# Patient Record
Sex: MALE | Race: WHITE
[De-identification: names, ages, dates, MRNs, and addresses within clinical notes are randomized per-mention and may not be internally consistent; named-entity substitution may affect disease eponyms.]

---

## 2020-03-01 ENCOUNTER — HOSPITAL ENCOUNTER (INPATIENT)
Dept: HOSPITAL 19 - COL.ER | Age: 60
LOS: 3 days | Discharge: HOME | DRG: 309 | End: 2020-03-04
Attending: INTERNAL MEDICINE | Admitting: INTERNAL MEDICINE
Payer: COMMERCIAL

## 2020-03-01 VITALS — OXYGEN SATURATION: 97 %

## 2020-03-01 VITALS — OXYGEN SATURATION: 94 %

## 2020-03-01 VITALS — OXYGEN SATURATION: 95 %

## 2020-03-01 VITALS — OXYGEN SATURATION: 96 %

## 2020-03-01 VITALS — OXYGEN SATURATION: 99 %

## 2020-03-01 VITALS — OXYGEN SATURATION: 92 %

## 2020-03-01 VITALS — OXYGEN SATURATION: 93 %

## 2020-03-01 VITALS
SYSTOLIC BLOOD PRESSURE: 107 MMHG | DIASTOLIC BLOOD PRESSURE: 76 MMHG | TEMPERATURE: 98.4 F | HEART RATE: 102 BPM | OXYGEN SATURATION: 96 %

## 2020-03-01 VITALS — OXYGEN SATURATION: 98 %

## 2020-03-01 VITALS — DIASTOLIC BLOOD PRESSURE: 76 MMHG | HEART RATE: 86 BPM | SYSTOLIC BLOOD PRESSURE: 107 MMHG | TEMPERATURE: 98.4 F

## 2020-03-01 VITALS — OXYGEN SATURATION: 100 %

## 2020-03-01 VITALS — WEIGHT: 178.79 LBS | HEIGHT: 74.02 IN | BODY MASS INDEX: 22.95 KG/M2

## 2020-03-01 DIAGNOSIS — Z92.21: ICD-10-CM

## 2020-03-01 DIAGNOSIS — J06.9: ICD-10-CM

## 2020-03-01 DIAGNOSIS — I48.0: Primary | ICD-10-CM

## 2020-03-01 DIAGNOSIS — D64.9: ICD-10-CM

## 2020-03-01 DIAGNOSIS — Z79.82: ICD-10-CM

## 2020-03-01 DIAGNOSIS — D69.6: ICD-10-CM

## 2020-03-01 DIAGNOSIS — D61.818: ICD-10-CM

## 2020-03-01 DIAGNOSIS — Z85.79: ICD-10-CM

## 2020-03-01 DIAGNOSIS — G62.9: ICD-10-CM

## 2020-03-01 DIAGNOSIS — Z79.01: ICD-10-CM

## 2020-03-01 LAB
ALBUMIN SERPL-MCNC: 3.7 GM/DL (ref 3.5–5)
ALP SERPL-CCNC: 76 U/L (ref 50–136)
ALT SERPL-CCNC: 28 U/L (ref 21–72)
ANION GAP SERPL CALC-SCNC: 9 MMOL/L (ref 7–16)
ANISOCYTOSIS BLD QL: (no result)
AST SERPL-CCNC: 34 U/L (ref 15–37)
BILIRUB SERPL-MCNC: 0.7 MG/DL (ref 0–1)
BUN SERPL-MCNC: 27 MG/DL (ref 9–20)
CALCIUM SERPL-MCNC: 8.9 MG/DL (ref 8.4–10.2)
CHLORIDE SERPL-SCNC: 101 MMOL/L (ref 98–107)
CO2 SERPL-SCNC: 25 MMOL/L (ref 22–30)
CREAT SERPL-SCNC: 1.17 UMOL/L (ref 0.66–1.25)
CRP SERPL-MCNC: 4.3 MG/DL (ref 0–0.9)
ERYTHROCYTE [DISTWIDTH] IN BLOOD BY AUTOMATED COUNT: 17.4 % (ref 11.5–14.5)
GLUCOSE SERPL-MCNC: 85 MG/DL (ref 74–106)
HCT VFR BLD AUTO: 37.7 % (ref 42–52)
HGB BLD-MCNC: 12.3 G/DL (ref 13.5–18)
HYPOCHROMIA BLD QL SMEAR: (no result)
LYMPHOCYTES NFR BLD MANUAL: 3 % (ref 20–51)
MACROCYTES BLD QL SMEAR: (no result)
MCH RBC QN AUTO: 33 PG (ref 27–31)
MCHC RBC AUTO-ENTMCNC: 33 G/DL (ref 33–37)
MCV RBC AUTO: 101 FL (ref 80–100)
MONOCYTES NFR BLD: 6 % (ref 1.7–9.3)
NEUTS BAND NFR BLD: 2 % (ref 0–10)
NEUTS SEG NFR BLD MANUAL: 89 % (ref 42–75.2)
NRBC BLD AUTO-RTO: 4 % (ref 0–6)
OVALOCYTES BLD QL SMEAR: (no result)
PLATELET # BLD AUTO: 85 K/MM3 (ref 130–400)
PLATELET BLD QL SMEAR: (no result)
PMV BLD AUTO: 13 FL (ref 7.4–10.4)
POTASSIUM SERPL-SCNC: 4.5 MMOL/L (ref 3.4–5)
PROT SERPL-MCNC: 6.6 GM/DL (ref 6.4–8.2)
RBC # BLD AUTO: 3.73 M/MM3 (ref 4.2–5.6)
SODIUM SERPL-SCNC: 135 MMOL/L (ref 137–145)
TROPONIN I SERPL-MCNC: < 0.012 NG/ML (ref 0–0.04)

## 2020-03-01 PROCEDURE — 5A2204Z RESTORATION OF CARDIAC RHYTHM, SINGLE: ICD-10-PCS | Performed by: INTERNAL MEDICINE

## 2020-03-02 VITALS
DIASTOLIC BLOOD PRESSURE: 86 MMHG | OXYGEN SATURATION: 94 % | TEMPERATURE: 100.5 F | HEART RATE: 71 BPM | SYSTOLIC BLOOD PRESSURE: 122 MMHG

## 2020-03-02 VITALS — OXYGEN SATURATION: 100 %

## 2020-03-02 VITALS — OXYGEN SATURATION: 96 %

## 2020-03-02 VITALS — OXYGEN SATURATION: 95 %

## 2020-03-02 VITALS — OXYGEN SATURATION: 94 %

## 2020-03-02 VITALS — OXYGEN SATURATION: 93 %

## 2020-03-02 VITALS — OXYGEN SATURATION: 91 %

## 2020-03-02 VITALS — OXYGEN SATURATION: 99 %

## 2020-03-02 VITALS — OXYGEN SATURATION: 95 % | SYSTOLIC BLOOD PRESSURE: 96 MMHG | HEART RATE: 75 BPM | DIASTOLIC BLOOD PRESSURE: 67 MMHG

## 2020-03-02 VITALS — OXYGEN SATURATION: 92 %

## 2020-03-02 VITALS — OXYGEN SATURATION: 89 %

## 2020-03-02 VITALS — OXYGEN SATURATION: 98 %

## 2020-03-02 VITALS — OXYGEN SATURATION: 97 %

## 2020-03-02 VITALS — HEART RATE: 78 BPM | DIASTOLIC BLOOD PRESSURE: 81 MMHG | SYSTOLIC BLOOD PRESSURE: 117 MMHG | TEMPERATURE: 98.8 F

## 2020-03-02 VITALS — DIASTOLIC BLOOD PRESSURE: 78 MMHG | HEART RATE: 68 BPM | SYSTOLIC BLOOD PRESSURE: 108 MMHG | OXYGEN SATURATION: 97 %

## 2020-03-02 VITALS
OXYGEN SATURATION: 94 % | HEART RATE: 85 BPM | SYSTOLIC BLOOD PRESSURE: 107 MMHG | TEMPERATURE: 97.8 F | DIASTOLIC BLOOD PRESSURE: 54 MMHG

## 2020-03-02 VITALS — OXYGEN SATURATION: 90 %

## 2020-03-02 VITALS — OXYGEN SATURATION: 93 % | DIASTOLIC BLOOD PRESSURE: 84 MMHG | HEART RATE: 75 BPM | SYSTOLIC BLOOD PRESSURE: 132 MMHG

## 2020-03-02 VITALS — OXYGEN SATURATION: 53 %

## 2020-03-02 VITALS — SYSTOLIC BLOOD PRESSURE: 126 MMHG | HEART RATE: 79 BPM | DIASTOLIC BLOOD PRESSURE: 87 MMHG | TEMPERATURE: 99.7 F

## 2020-03-02 VITALS — DIASTOLIC BLOOD PRESSURE: 80 MMHG | HEART RATE: 60 BPM | TEMPERATURE: 97.8 F | SYSTOLIC BLOOD PRESSURE: 105 MMHG

## 2020-03-02 VITALS — OXYGEN SATURATION: 84 %

## 2020-03-02 VITALS — SYSTOLIC BLOOD PRESSURE: 110 MMHG | HEART RATE: 86 BPM | DIASTOLIC BLOOD PRESSURE: 82 MMHG

## 2020-03-02 VITALS — OXYGEN SATURATION: 87 %

## 2020-03-02 VITALS — OXYGEN SATURATION: 98 % | DIASTOLIC BLOOD PRESSURE: 73 MMHG | SYSTOLIC BLOOD PRESSURE: 102 MMHG | HEART RATE: 78 BPM

## 2020-03-02 VITALS — OXYGEN SATURATION: 81 %

## 2020-03-02 VITALS
SYSTOLIC BLOOD PRESSURE: 122 MMHG | TEMPERATURE: 98.8 F | HEART RATE: 80 BPM | DIASTOLIC BLOOD PRESSURE: 88 MMHG | OXYGEN SATURATION: 94 %

## 2020-03-02 VITALS — HEART RATE: 70 BPM

## 2020-03-02 LAB
ANION GAP SERPL CALC-SCNC: 5 MMOL/L (ref 7–16)
BUN SERPL-MCNC: 21 MG/DL (ref 9–20)
CALCIUM SERPL-MCNC: 7.6 MG/DL (ref 8.4–10.2)
CHLORIDE SERPL-SCNC: 106 MMOL/L (ref 98–107)
CO2 SERPL-SCNC: 22 MMOL/L (ref 22–30)
CREAT SERPL-SCNC: 0.76 UMOL/L (ref 0.66–1.25)
ERYTHROCYTE [DISTWIDTH] IN BLOOD BY AUTOMATED COUNT: 17.4 % (ref 11.5–14.5)
GLUCOSE SERPL-MCNC: 81 MG/DL (ref 74–106)
HCT VFR BLD AUTO: 30.6 % (ref 42–52)
HGB BLD-MCNC: 10.1 G/DL (ref 13.5–18)
LYMPHOCYTES NFR BLD MANUAL: 4 % (ref 20–51)
MCH RBC QN AUTO: 33 PG (ref 27–31)
MCHC RBC AUTO-ENTMCNC: 33 G/DL (ref 33–37)
MCV RBC AUTO: 100 FL (ref 80–100)
MONOCYTES NFR BLD: 5 % (ref 1.7–9.3)
NEUTS BAND NFR BLD: 6 % (ref 0–10)
NEUTS SEG NFR BLD MANUAL: 85 % (ref 42–75.2)
OVALOCYTES BLD QL SMEAR: (no result)
PLATELET # BLD AUTO: 63 K/MM3 (ref 130–400)
PMV BLD AUTO: 12.7 FL (ref 7.4–10.4)
POTASSIUM SERPL-SCNC: 4 MMOL/L (ref 3.4–5)
RBC # BLD AUTO: 3.05 M/MM3 (ref 4.2–5.6)
SODIUM SERPL-SCNC: 133 MMOL/L (ref 137–145)

## 2020-03-02 NOTE — NUR
Cardioverted with once synched shock at 200 Joules. Received 200mg Propofol
for procedure. SPO2 is now 94% on RA.
Pt coughing continuously during procedure. Oral suction returns small to
scant amount of clear thin secretions. Hospitalist rounds at this time. Wife
in room and updated with plan of care to include addition of sotalol, prn
medications for persistent cough, and prophylactic Levaquin. Transthoracic
echo in progress. SR rate of 70.

## 2020-03-02 NOTE — NUR
MSW student met with the patient to complete initial intake. The patient lives
in [place] with his wife, Deneen. The patient denies DME usage and is
independent with ADLs. The patient's PCP is Dr. Parra and oncologist is Dr. Mcclure. The patient receives medications from [facility]. The patient
does not have advanced directives in the EMR but states they are completed and
designate his wife, Deneen.  will continue to follow to ensure a
safe discharge.

## 2020-03-03 VITALS — OXYGEN SATURATION: 94 %

## 2020-03-03 VITALS — OXYGEN SATURATION: 95 %

## 2020-03-03 VITALS — OXYGEN SATURATION: 96 %

## 2020-03-03 VITALS — OXYGEN SATURATION: 93 %

## 2020-03-03 VITALS — OXYGEN SATURATION: 91 %

## 2020-03-03 VITALS
OXYGEN SATURATION: 94 % | DIASTOLIC BLOOD PRESSURE: 87 MMHG | SYSTOLIC BLOOD PRESSURE: 130 MMHG | TEMPERATURE: 100.7 F | HEART RATE: 72 BPM

## 2020-03-03 VITALS — TEMPERATURE: 97.3 F | SYSTOLIC BLOOD PRESSURE: 122 MMHG | HEART RATE: 75 BPM | DIASTOLIC BLOOD PRESSURE: 87 MMHG

## 2020-03-03 VITALS — HEART RATE: 77 BPM | SYSTOLIC BLOOD PRESSURE: 136 MMHG | TEMPERATURE: 99.5 F | DIASTOLIC BLOOD PRESSURE: 96 MMHG

## 2020-03-03 VITALS — OXYGEN SATURATION: 92 %

## 2020-03-03 VITALS — TEMPERATURE: 98.5 F | SYSTOLIC BLOOD PRESSURE: 94 MMHG | DIASTOLIC BLOOD PRESSURE: 60 MMHG | HEART RATE: 91 BPM

## 2020-03-03 VITALS — OXYGEN SATURATION: 82 %

## 2020-03-03 VITALS — OXYGEN SATURATION: 97 %

## 2020-03-03 VITALS — OXYGEN SATURATION: 88 %

## 2020-03-03 VITALS — OXYGEN SATURATION: 87 %

## 2020-03-03 VITALS — OXYGEN SATURATION: 84 %

## 2020-03-03 VITALS
SYSTOLIC BLOOD PRESSURE: 129 MMHG | TEMPERATURE: 99.9 F | HEART RATE: 73 BPM | DIASTOLIC BLOOD PRESSURE: 88 MMHG | OXYGEN SATURATION: 95 %

## 2020-03-03 VITALS — DIASTOLIC BLOOD PRESSURE: 94 MMHG | TEMPERATURE: 98.6 F | HEART RATE: 68 BPM | SYSTOLIC BLOOD PRESSURE: 138 MMHG

## 2020-03-03 VITALS — OXYGEN SATURATION: 86 %

## 2020-03-03 VITALS — OXYGEN SATURATION: 89 %

## 2020-03-03 VITALS — OXYGEN SATURATION: 79 %

## 2020-03-03 LAB
ANION GAP SERPL CALC-SCNC: 6 MMOL/L (ref 7–16)
ANISOCYTOSIS BLD QL: (no result)
BUN SERPL-MCNC: 18 MG/DL (ref 9–20)
CALCIUM SERPL-MCNC: 7.1 MG/DL (ref 8.4–10.2)
CHLORIDE SERPL-SCNC: 104 MMOL/L (ref 98–107)
CO2 SERPL-SCNC: 20 MMOL/L (ref 22–30)
CREAT SERPL-SCNC: 0.96 UMOL/L (ref 0.66–1.25)
ERYTHROCYTE [DISTWIDTH] IN BLOOD BY AUTOMATED COUNT: 17.1 % (ref 11.5–14.5)
GLUCOSE SERPL-MCNC: 108 MG/DL (ref 74–106)
HCT VFR BLD AUTO: 30.3 % (ref 42–52)
HGB BLD-MCNC: 9.8 G/DL (ref 13.5–18)
HYPOCHROMIA BLD QL SMEAR: (no result)
MACROCYTES BLD QL SMEAR: (no result)
MAGNESIUM SERPL-MCNC: 2 MG/DL (ref 1.6–2.3)
MCH RBC QN AUTO: 32 PG (ref 27–31)
MCHC RBC AUTO-ENTMCNC: 32 G/DL (ref 33–37)
MCV RBC AUTO: 100 FL (ref 80–100)
MONOCYTES NFR BLD: 2 % (ref 1.7–9.3)
NEUTS BAND NFR BLD: 6 % (ref 0–10)
NEUTS SEG NFR BLD MANUAL: 92 % (ref 42–75.2)
PLATELET # BLD AUTO: 61 K/MM3 (ref 130–400)
PLATELET BLD QL SMEAR: (no result)
PMV BLD AUTO: 12.1 FL (ref 7.4–10.4)
POTASSIUM SERPL-SCNC: 3.6 MMOL/L (ref 3.4–5)
RBC # BLD AUTO: 3.02 M/MM3 (ref 4.2–5.6)
SODIUM SERPL-SCNC: 131 MMOL/L (ref 137–145)

## 2020-03-03 NOTE — NUR
Patient continues to exhibit low-grade temperature, with last temp being
100.7 F. Attempted to cool patient by removing blankets and turning on fan,
however, patient replaces blankets upon staff exit and requests that fan be
turned off. Will notify Irene and continue to monitor.

## 2020-03-03 NOTE — NUR
Notified Ireen of low-grade fever and noncompliance with attempts to cool.
Received orders for Tylenol 650mg PO, every 4 hours as needed.

## 2020-03-04 VITALS — OXYGEN SATURATION: 94 %

## 2020-03-04 VITALS — OXYGEN SATURATION: 98 %

## 2020-03-04 VITALS — OXYGEN SATURATION: 96 %

## 2020-03-04 VITALS — OXYGEN SATURATION: 93 %

## 2020-03-04 VITALS — OXYGEN SATURATION: 95 %

## 2020-03-04 VITALS — OXYGEN SATURATION: 97 %

## 2020-03-04 VITALS — OXYGEN SATURATION: 99 %

## 2020-03-04 VITALS — SYSTOLIC BLOOD PRESSURE: 130 MMHG | DIASTOLIC BLOOD PRESSURE: 91 MMHG | HEART RATE: 73 BPM | TEMPERATURE: 99.6 F

## 2020-03-04 VITALS
OXYGEN SATURATION: 95 % | SYSTOLIC BLOOD PRESSURE: 137 MMHG | HEART RATE: 75 BPM | DIASTOLIC BLOOD PRESSURE: 88 MMHG | TEMPERATURE: 100.2 F

## 2020-03-04 VITALS — HEART RATE: 66 BPM | DIASTOLIC BLOOD PRESSURE: 88 MMHG | TEMPERATURE: 98.2 F | SYSTOLIC BLOOD PRESSURE: 124 MMHG

## 2020-03-04 VITALS — OXYGEN SATURATION: 92 %

## 2020-03-04 VITALS — OXYGEN SATURATION: 100 %

## 2020-03-04 LAB
ANION GAP SERPL CALC-SCNC: 6 MMOL/L (ref 7–16)
ANISOCYTOSIS BLD QL: (no result)
BUN SERPL-MCNC: 15 MG/DL (ref 9–20)
CALCIUM SERPL-MCNC: 7.1 MG/DL (ref 8.4–10.2)
CHLORIDE SERPL-SCNC: 101 MMOL/L (ref 98–107)
CO2 SERPL-SCNC: 21 MMOL/L (ref 22–30)
CREAT SERPL-SCNC: 0.88 UMOL/L (ref 0.66–1.25)
ERYTHROCYTE [DISTWIDTH] IN BLOOD BY AUTOMATED COUNT: 16.6 % (ref 11.5–14.5)
GLUCOSE SERPL-MCNC: 83 MG/DL (ref 74–106)
HCT VFR BLD AUTO: 30 % (ref 42–52)
HGB BLD-MCNC: 10.2 G/DL (ref 13.5–18)
LYMPHOCYTES NFR BLD MANUAL: 4 % (ref 20–51)
MAGNESIUM SERPL-MCNC: 2.1 MG/DL (ref 1.6–2.3)
MCH RBC QN AUTO: 33 PG (ref 27–31)
MCHC RBC AUTO-ENTMCNC: 34 G/DL (ref 33–37)
MCV RBC AUTO: 98 FL (ref 80–100)
MONOCYTES NFR BLD: 1 % (ref 1.7–9.3)
NEUTS BAND NFR BLD: 18 % (ref 0–10)
NEUTS SEG NFR BLD MANUAL: 77 % (ref 42–75.2)
OVALOCYTES BLD QL SMEAR: (no result)
PLATELET # BLD AUTO: 70 K/MM3 (ref 130–400)
PLATELET BLD QL SMEAR: (no result)
PMV BLD AUTO: 13 FL (ref 7.4–10.4)
POTASSIUM SERPL-SCNC: 3.7 MMOL/L (ref 3.4–5)
RBC # BLD AUTO: 3.07 M/MM3 (ref 4.2–5.6)
SODIUM SERPL-SCNC: 129 MMOL/L (ref 137–145)
SPHEROCYTES BLD QL SMEAR: (no result)

## 2020-03-04 NOTE — NUR
T 100.2; Prn tylenol administered.  Patient reports feeling "fine". Extra
blankets removed from bed.  No other complaints at this time.

## 2020-12-07 ENCOUNTER — HOSPITAL ENCOUNTER (OUTPATIENT)
Dept: HOSPITAL 19 - COL.LAB | Age: 60
End: 2020-12-07
Attending: INTERNAL MEDICINE
Payer: COMMERCIAL

## 2020-12-07 DIAGNOSIS — U07.1: Primary | ICD-10-CM

## 2020-12-22 ENCOUNTER — HOSPITAL ENCOUNTER (OUTPATIENT)
Dept: HOSPITAL 19 - COL.CAR | Age: 60
Discharge: HOME | End: 2020-12-22
Attending: INTERNAL MEDICINE
Payer: COMMERCIAL

## 2020-12-22 VITALS — HEART RATE: 75 BPM | SYSTOLIC BLOOD PRESSURE: 105 MMHG | DIASTOLIC BLOOD PRESSURE: 77 MMHG

## 2020-12-22 VITALS — DIASTOLIC BLOOD PRESSURE: 80 MMHG | SYSTOLIC BLOOD PRESSURE: 104 MMHG | HEART RATE: 80 BPM

## 2020-12-22 VITALS — SYSTOLIC BLOOD PRESSURE: 107 MMHG | DIASTOLIC BLOOD PRESSURE: 76 MMHG | HEART RATE: 81 BPM

## 2020-12-22 VITALS — WEIGHT: 171.96 LBS | HEIGHT: 74.07 IN | BODY MASS INDEX: 22.07 KG/M2

## 2020-12-22 VITALS — HEART RATE: 81 BPM | DIASTOLIC BLOOD PRESSURE: 75 MMHG | SYSTOLIC BLOOD PRESSURE: 104 MMHG

## 2020-12-22 VITALS — SYSTOLIC BLOOD PRESSURE: 107 MMHG | DIASTOLIC BLOOD PRESSURE: 77 MMHG | HEART RATE: 79 BPM

## 2020-12-22 VITALS — HEART RATE: 107 BPM | DIASTOLIC BLOOD PRESSURE: 71 MMHG | TEMPERATURE: 98.1 F | SYSTOLIC BLOOD PRESSURE: 97 MMHG

## 2020-12-22 DIAGNOSIS — D64.9: ICD-10-CM

## 2020-12-22 DIAGNOSIS — I08.3: ICD-10-CM

## 2020-12-22 DIAGNOSIS — Z79.82: ICD-10-CM

## 2020-12-22 DIAGNOSIS — G62.9: ICD-10-CM

## 2020-12-22 DIAGNOSIS — I48.91: Primary | ICD-10-CM

## 2020-12-22 LAB
ANION GAP SERPL CALC-SCNC: 9 MMOL/L (ref 7–16)
APTT PPP: 30.9 SECONDS (ref 26–37)
BUN SERPL-MCNC: 18 MG/DL (ref 9–20)
CALCIUM SERPL-MCNC: 8.1 MG/DL (ref 8.4–10.2)
CHLORIDE SERPL-SCNC: 98 MMOL/L (ref 98–107)
CO2 SERPL-SCNC: 25 MMOL/L (ref 22–30)
CREAT SERPL-SCNC: 1.04 UMOL/L (ref 0.66–1.25)
ERYTHROCYTE [DISTWIDTH] IN BLOOD BY AUTOMATED COUNT: 16.8 % (ref 11.5–14.5)
GLUCOSE SERPL-MCNC: 99 MG/DL (ref 74–106)
HCT VFR BLD AUTO: 35.2 % (ref 42–52)
HGB BLD-MCNC: 11.7 G/DL (ref 13.5–18)
INR BLD: 1.4 (ref 0.8–3)
MAGNESIUM SERPL-MCNC: 2 MG/DL (ref 1.6–2.3)
MCH RBC QN AUTO: 33 PG (ref 27–31)
MCHC RBC AUTO-ENTMCNC: 33 G/DL (ref 33–37)
MCV RBC AUTO: 98 FL (ref 80–100)
PLATELET # BLD AUTO: 216 K/MM3 (ref 130–400)
PMV BLD AUTO: 10.5 FL (ref 7.4–10.4)
POTASSIUM SERPL-SCNC: 4.2 MMOL/L (ref 3.4–5)
PROTHROMBIN TIME: 15.5 SECONDS (ref 9.7–12.8)
RBC # BLD AUTO: 3.59 M/MM3 (ref 4.2–5.6)
SODIUM SERPL-SCNC: 132 MMOL/L (ref 137–145)
TSH SERPL DL<=0.005 MIU/L-ACNC: 1.82 UIU/ML (ref 0.47–4.68)

## 2021-02-18 ENCOUNTER — HOSPITAL ENCOUNTER (OUTPATIENT)
Dept: HOSPITAL 19 - COL.CAR | Age: 61
Discharge: HOME | End: 2021-02-18
Attending: INTERNAL MEDICINE
Payer: COMMERCIAL

## 2021-02-18 VITALS — DIASTOLIC BLOOD PRESSURE: 80 MMHG | TEMPERATURE: 98.4 F | HEART RATE: 54 BPM | SYSTOLIC BLOOD PRESSURE: 116 MMHG

## 2021-02-18 VITALS — WEIGHT: 183.42 LBS | BODY MASS INDEX: 23.54 KG/M2 | HEIGHT: 74.07 IN

## 2021-02-18 DIAGNOSIS — Z79.01: ICD-10-CM

## 2021-02-18 DIAGNOSIS — Z20.828: ICD-10-CM

## 2021-02-18 DIAGNOSIS — Z85.79: ICD-10-CM

## 2021-02-18 DIAGNOSIS — D64.9: ICD-10-CM

## 2021-02-18 DIAGNOSIS — D69.6: ICD-10-CM

## 2021-02-18 DIAGNOSIS — I10: ICD-10-CM

## 2021-02-18 DIAGNOSIS — Z53.8: ICD-10-CM

## 2021-02-18 DIAGNOSIS — Z79.82: ICD-10-CM

## 2021-02-18 DIAGNOSIS — I48.91: Primary | ICD-10-CM

## 2021-02-18 NOTE — NUR
Procedure cancelled by Dr Martinez.Discharge instructions given to pt.Pt
verbalizes understanding.Pt escorted out via ambulatory.

## 2021-04-13 ENCOUNTER — HOSPITAL ENCOUNTER (OUTPATIENT)
Dept: HOSPITAL 19 - COL.CAR | Age: 61
Discharge: HOME | End: 2021-04-13
Attending: INTERNAL MEDICINE
Payer: COMMERCIAL

## 2021-04-13 VITALS — HEART RATE: 59 BPM | DIASTOLIC BLOOD PRESSURE: 77 MMHG | SYSTOLIC BLOOD PRESSURE: 107 MMHG

## 2021-04-13 VITALS — SYSTOLIC BLOOD PRESSURE: 102 MMHG | HEART RATE: 58 BPM | DIASTOLIC BLOOD PRESSURE: 63 MMHG

## 2021-04-13 VITALS — HEART RATE: 57 BPM | TEMPERATURE: 98.3 F | SYSTOLIC BLOOD PRESSURE: 110 MMHG | DIASTOLIC BLOOD PRESSURE: 88 MMHG

## 2021-04-13 VITALS — WEIGHT: 192.46 LBS | BODY MASS INDEX: 24.7 KG/M2 | HEIGHT: 74.02 IN

## 2021-04-13 VITALS — HEART RATE: 52 BPM | DIASTOLIC BLOOD PRESSURE: 79 MMHG | SYSTOLIC BLOOD PRESSURE: 110 MMHG

## 2021-04-13 VITALS — SYSTOLIC BLOOD PRESSURE: 101 MMHG | HEART RATE: 56 BPM | DIASTOLIC BLOOD PRESSURE: 78 MMHG

## 2021-04-13 DIAGNOSIS — I10: ICD-10-CM

## 2021-04-13 DIAGNOSIS — Z79.01: ICD-10-CM

## 2021-04-13 DIAGNOSIS — I48.0: Primary | ICD-10-CM

## 2021-04-13 DIAGNOSIS — I34.0: ICD-10-CM

## 2021-04-13 DIAGNOSIS — D64.9: ICD-10-CM

## 2021-04-13 DIAGNOSIS — G62.9: ICD-10-CM

## 2021-04-13 DIAGNOSIS — C90.00: ICD-10-CM

## 2021-04-13 LAB
ANION GAP SERPL CALC-SCNC: 6 MMOL/L (ref 7–16)
APTT PPP: 29 SECONDS (ref 26–37)
BUN SERPL-MCNC: 22 MG/DL (ref 9–20)
CALCIUM SERPL-MCNC: 8.6 MG/DL (ref 8.4–10.2)
CHLORIDE SERPL-SCNC: 103 MMOL/L (ref 98–107)
CO2 SERPL-SCNC: 25 MMOL/L (ref 22–30)
CREAT SERPL-SCNC: 1 UMOL/L (ref 0.66–1.25)
ERYTHROCYTE [DISTWIDTH] IN BLOOD BY AUTOMATED COUNT: 16.2 % (ref 11.5–14.5)
GLUCOSE SERPL-MCNC: 92 MG/DL (ref 74–106)
HCT VFR BLD AUTO: 40.2 % (ref 42–52)
HGB BLD-MCNC: 13 G/DL (ref 13.5–18)
INR BLD: 1 (ref 0.8–3)
MAGNESIUM SERPL-MCNC: 1.8 MG/DL (ref 1.6–2.3)
MCH RBC QN AUTO: 34 PG (ref 27–31)
MCHC RBC AUTO-ENTMCNC: 32 G/DL (ref 33–37)
MCV RBC AUTO: 106 FL (ref 80–100)
PLATELET # BLD AUTO: 221 K/MM3 (ref 130–400)
PMV BLD AUTO: 9.9 FL (ref 7.4–10.4)
POTASSIUM SERPL-SCNC: 4.2 MMOL/L (ref 3.4–5)
PROTHROMBIN TIME: 11 SECONDS (ref 9.7–12.8)
RBC # BLD AUTO: 3.81 M/MM3 (ref 4.2–5.6)
SODIUM SERPL-SCNC: 134 MMOL/L (ref 137–145)
TSH SERPL DL<=0.005 MIU/L-ACNC: 2.22 UIU/ML (ref 0.47–4.68)

## 2021-04-13 NOTE — NUR
DC instructions reviewed with pt, he expresses understanding, denies questions
and signs Pt Signature Page. Pt has remained in sinus rhythm following
cardioversion, and states he is feeling better. Gait steady to restroom. IV
DC'd with catheter intact. He will wait in room for wife's arrival.

## 2021-06-08 ENCOUNTER — HOSPITAL ENCOUNTER (OUTPATIENT)
Dept: HOSPITAL 19 - COL.RAD | Age: 61
End: 2021-06-08
Payer: COMMERCIAL

## 2021-06-08 DIAGNOSIS — R06.00: ICD-10-CM

## 2021-06-08 DIAGNOSIS — C90.02: ICD-10-CM

## 2021-06-08 DIAGNOSIS — S22.31XA: Primary | ICD-10-CM

## 2021-10-02 ENCOUNTER — HOSPITAL ENCOUNTER (EMERGENCY)
Dept: HOSPITAL 19 - COL.ER | Age: 61
LOS: 1 days | Discharge: HOME | End: 2021-10-03
Attending: EMERGENCY MEDICINE
Payer: COMMERCIAL

## 2021-10-02 VITALS — HEIGHT: 74.02 IN | WEIGHT: 182.32 LBS | BODY MASS INDEX: 23.4 KG/M2

## 2021-10-02 VITALS — TEMPERATURE: 98.4 F

## 2021-10-02 DIAGNOSIS — Z85.79: ICD-10-CM

## 2021-10-02 DIAGNOSIS — S39.012A: Primary | ICD-10-CM
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
## 2021-10-02 DIAGNOSIS — Z87.81: ICD-10-CM

## 2021-10-03 VITALS — DIASTOLIC BLOOD PRESSURE: 70 MMHG | HEART RATE: 76 BPM | SYSTOLIC BLOOD PRESSURE: 124 MMHG

## 2021-10-10 ENCOUNTER — HOSPITAL ENCOUNTER (EMERGENCY)
Dept: HOSPITAL 19 - COL.ER | Age: 61
LOS: 1 days | Discharge: HOME | End: 2021-10-11
Attending: STUDENT IN AN ORGANIZED HEALTH CARE EDUCATION/TRAINING PROGRAM
Payer: COMMERCIAL

## 2021-10-10 VITALS — BODY MASS INDEX: 23.4 KG/M2 | HEIGHT: 74.02 IN | WEIGHT: 182.32 LBS

## 2021-10-10 VITALS — TEMPERATURE: 98.1 F
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
## 2021-10-10 DIAGNOSIS — Z87.39: ICD-10-CM

## 2021-10-10 DIAGNOSIS — S32.009A: Primary | ICD-10-CM

## 2021-10-10 DIAGNOSIS — S32.9XXA: ICD-10-CM

## 2021-10-11 VITALS — SYSTOLIC BLOOD PRESSURE: 122 MMHG | DIASTOLIC BLOOD PRESSURE: 76 MMHG | HEART RATE: 62 BPM

## 2021-10-21 ENCOUNTER — HOSPITAL ENCOUNTER (OUTPATIENT)
Dept: HOSPITAL 6 - RAD | Age: 61
End: 2021-10-21
Payer: COMMERCIAL

## 2021-10-21 DIAGNOSIS — M62.9: ICD-10-CM

## 2021-10-21 DIAGNOSIS — C90.00: Primary | ICD-10-CM

## 2021-10-21 PROCEDURE — A9585 GADOBUTROL INJECTION: HCPCS

## 2021-10-27 ENCOUNTER — HOSPITAL ENCOUNTER (OUTPATIENT)
Dept: HOSPITAL 19 - COL.RAD | Age: 61
End: 2021-10-27
Attending: FAMILY MEDICINE
Payer: COMMERCIAL

## 2021-10-27 VITALS — HEART RATE: 91 BPM | DIASTOLIC BLOOD PRESSURE: 78 MMHG | SYSTOLIC BLOOD PRESSURE: 129 MMHG

## 2021-10-27 VITALS — WEIGHT: 174.17 LBS | HEIGHT: 74.02 IN | BODY MASS INDEX: 22.35 KG/M2

## 2021-10-27 VITALS — TEMPERATURE: 97.9 F | DIASTOLIC BLOOD PRESSURE: 71 MMHG | HEART RATE: 97 BPM | SYSTOLIC BLOOD PRESSURE: 106 MMHG

## 2021-10-27 DIAGNOSIS — C90.00: Primary | ICD-10-CM

## 2021-10-27 DIAGNOSIS — R22.42: ICD-10-CM

## 2021-10-27 PROCEDURE — 32109: CPT

## 2021-11-03 ENCOUNTER — HOSPITAL ENCOUNTER (INPATIENT)
Dept: HOSPITAL 19 - MEDICAL | Age: 61
LOS: 4 days | Discharge: HOME | DRG: 603 | End: 2021-11-07
Attending: INTERNAL MEDICINE | Admitting: INTERNAL MEDICINE
Payer: COMMERCIAL

## 2021-11-03 VITALS — TEMPERATURE: 98.3 F | DIASTOLIC BLOOD PRESSURE: 78 MMHG | SYSTOLIC BLOOD PRESSURE: 139 MMHG | HEART RATE: 80 BPM

## 2021-11-03 VITALS — DIASTOLIC BLOOD PRESSURE: 83 MMHG | TEMPERATURE: 100.1 F | HEART RATE: 92 BPM | SYSTOLIC BLOOD PRESSURE: 161 MMHG

## 2021-11-03 VITALS — BODY MASS INDEX: 22.41 KG/M2 | HEIGHT: 74.02 IN | WEIGHT: 174.61 LBS

## 2021-11-03 DIAGNOSIS — I10: ICD-10-CM

## 2021-11-03 DIAGNOSIS — R78.81: ICD-10-CM

## 2021-11-03 DIAGNOSIS — I20.8: ICD-10-CM

## 2021-11-03 DIAGNOSIS — C90.00: ICD-10-CM

## 2021-11-03 DIAGNOSIS — M48.54XA: ICD-10-CM

## 2021-11-03 DIAGNOSIS — M48.56XA: ICD-10-CM

## 2021-11-03 DIAGNOSIS — D64.9: ICD-10-CM

## 2021-11-03 DIAGNOSIS — B95.1: ICD-10-CM

## 2021-11-03 DIAGNOSIS — L02.31: ICD-10-CM

## 2021-11-03 DIAGNOSIS — L02.214: Primary | ICD-10-CM

## 2021-11-03 DIAGNOSIS — R07.89: ICD-10-CM

## 2021-11-03 DIAGNOSIS — E44.0: ICD-10-CM

## 2021-11-03 LAB
ALBUMIN SERPL-MCNC: 1.8 GM/DL (ref 3.4–4.8)
ALP SERPL-CCNC: 102 U/L (ref 40–150)
ALT SERPL-CCNC: 20 U/L (ref 0–55)
ANION GAP SERPL CALC-SCNC: 12 MMOL/L (ref 7–16)
AST SERPL-CCNC: 18 U/L (ref 5–34)
BILIRUB SERPL-MCNC: 0.4 MG/DL (ref 0.2–1.2)
BUN SERPL-MCNC: 31 MG/DL (ref 8–26)
CALCIUM SERPL-MCNC: 8.7 MG/DL (ref 8.4–10.2)
CHLORIDE SERPL-SCNC: 111 MMOL/L (ref 98–107)
CO2 SERPL-SCNC: 17 MMOL/L (ref 23–31)
CREAT SERPL-SCNC: 1.24 MG/DL (ref 0.72–1.25)
EOSINOPHIL NFR BLD: 2 % (ref 0–4)
ERYTHROCYTE [DISTWIDTH] IN BLOOD BY AUTOMATED COUNT: 15.6 % (ref 11.5–14.5)
GLUCOSE SERPL-MCNC: 96 MG/DL (ref 70–99)
HCT VFR BLD AUTO: 25.6 % (ref 42–52)
HGB BLD-MCNC: 8.4 G/DL (ref 13.5–18)
LYMPHOCYTES NFR BLD MANUAL: 2 % (ref 20–51)
MCH RBC QN AUTO: 30 PG (ref 27–31)
MCHC RBC AUTO-ENTMCNC: 33 G/DL (ref 33–37)
MCV RBC AUTO: 93 FL (ref 80–100)
MONOCYTES NFR BLD: 11 % (ref 1.7–9.3)
NEUTS BAND NFR BLD: 18 % (ref 0–10)
NEUTS SEG NFR BLD MANUAL: 67 % (ref 42–75.2)
PLATELET # BLD AUTO: 94 K/MM3 (ref 130–400)
PLATELET BLD QL SMEAR: (no result)
PMV BLD AUTO: 11.7 FL (ref 7.4–10.4)
POTASSIUM SERPL-SCNC: 3.7 MMOL/L (ref 3.5–4.5)
PROT SERPL-MCNC: 6.4 GM/DL (ref 6.2–8.1)
RBC # BLD AUTO: 2.76 M/MM3 (ref 4.2–5.6)
SODIUM SERPL-SCNC: 140 MMOL/L (ref 136–145)

## 2021-11-03 NOTE — NUR
Vancomycin Initial Dosing Pharmacy Note
Ordering provider: Diana Mccoy DO
Indication/duration: Abscess, 7 days
LABS: SCr 1.24, CrCl~63. GFR 59
Recommendation: Give Vancomycin 1.5 gm IV x1 loading dose, then Vancomycin 1 gm
IV q12h. Pharmacy will continue to closely monitor and check a Vancomycin
trough on 11/5/21.
Loading dose: 1.5 grams
Maintenance dose: 1 gram every 12 hours
Trough goal: 15-20 ug/mL

## 2021-11-03 NOTE — NUR
Patient alert and oriented. Patient reports sharp pain to his left bottock
area 7/10. Patient denies SOB or N/V. Patient reports having chills.
Temp 100.1 F at this time. PRN Oxycodone and Tylenol given. IV ABX currently
infusing per MAR. Patient tolerated PO intake. Informed patient of NPO from
midnight. Patient verbalized understanding. Covid PCR sample collected and
sent to lab. Call light in reach. Will continue to monitor.

## 2021-11-03 NOTE — NUR
PT ADMITTED THIS AFTERNOON FOR SCHEDULED DRAINAGE OF ABSCESS. PT RESTING IN
BED. DENIES PAIN. NO NEEDS AT THIS TIME. CALL BELL IN REACH

## 2021-11-04 VITALS — SYSTOLIC BLOOD PRESSURE: 149 MMHG | HEART RATE: 90 BPM | DIASTOLIC BLOOD PRESSURE: 93 MMHG

## 2021-11-04 VITALS — HEART RATE: 84 BPM | TEMPERATURE: 98.3 F | DIASTOLIC BLOOD PRESSURE: 79 MMHG | SYSTOLIC BLOOD PRESSURE: 137 MMHG

## 2021-11-04 VITALS — SYSTOLIC BLOOD PRESSURE: 141 MMHG | DIASTOLIC BLOOD PRESSURE: 74 MMHG | HEART RATE: 92 BPM | TEMPERATURE: 99.3 F

## 2021-11-04 VITALS — SYSTOLIC BLOOD PRESSURE: 147 MMHG | HEART RATE: 84 BPM | DIASTOLIC BLOOD PRESSURE: 90 MMHG

## 2021-11-04 VITALS — HEART RATE: 88 BPM | DIASTOLIC BLOOD PRESSURE: 77 MMHG | TEMPERATURE: 99.7 F | SYSTOLIC BLOOD PRESSURE: 144 MMHG

## 2021-11-04 VITALS — DIASTOLIC BLOOD PRESSURE: 93 MMHG | HEART RATE: 97 BPM | SYSTOLIC BLOOD PRESSURE: 114 MMHG | TEMPERATURE: 98.4 F

## 2021-11-04 VITALS — SYSTOLIC BLOOD PRESSURE: 147 MMHG | DIASTOLIC BLOOD PRESSURE: 88 MMHG | HEART RATE: 83 BPM | TEMPERATURE: 98.5 F

## 2021-11-04 VITALS — SYSTOLIC BLOOD PRESSURE: 149 MMHG | HEART RATE: 87 BPM | DIASTOLIC BLOOD PRESSURE: 90 MMHG

## 2021-11-04 VITALS — DIASTOLIC BLOOD PRESSURE: 86 MMHG | HEART RATE: 83 BPM | SYSTOLIC BLOOD PRESSURE: 148 MMHG

## 2021-11-04 VITALS — HEART RATE: 98 BPM | SYSTOLIC BLOOD PRESSURE: 139 MMHG | DIASTOLIC BLOOD PRESSURE: 83 MMHG | TEMPERATURE: 98.7 F

## 2021-11-04 VITALS — DIASTOLIC BLOOD PRESSURE: 78 MMHG | HEART RATE: 89 BPM | TEMPERATURE: 98.4 F | SYSTOLIC BLOOD PRESSURE: 155 MMHG

## 2021-11-04 VITALS — DIASTOLIC BLOOD PRESSURE: 105 MMHG | HEART RATE: 86 BPM | SYSTOLIC BLOOD PRESSURE: 150 MMHG

## 2021-11-04 VITALS — SYSTOLIC BLOOD PRESSURE: 144 MMHG | HEART RATE: 87 BPM | DIASTOLIC BLOOD PRESSURE: 89 MMHG

## 2021-11-04 VITALS — HEART RATE: 86 BPM | SYSTOLIC BLOOD PRESSURE: 148 MMHG | DIASTOLIC BLOOD PRESSURE: 90 MMHG

## 2021-11-04 LAB
ANION GAP SERPL CALC-SCNC: 12 MMOL/L (ref 7–16)
BUN SERPL-MCNC: 24 MG/DL (ref 8–26)
CALCIUM SERPL-MCNC: 8.5 MG/DL (ref 8.4–10.2)
CHLORIDE SERPL-SCNC: 111 MMOL/L (ref 98–107)
CO2 SERPL-SCNC: 18 MMOL/L (ref 23–31)
CREAT SERPL-SCNC: 1.08 MG/DL (ref 0.72–1.25)
EOSINOPHIL NFR BLD: 4 % (ref 0–4)
ERYTHROCYTE [DISTWIDTH] IN BLOOD BY AUTOMATED COUNT: 15.7 % (ref 11.5–14.5)
GLUCOSE SERPL-MCNC: 82 MG/DL (ref 70–99)
HCT VFR BLD AUTO: 25.2 % (ref 42–52)
HGB BLD-MCNC: 8.3 G/DL (ref 13.5–18)
LYMPHOCYTES NFR BLD MANUAL: 1 % (ref 20–51)
MCH RBC QN AUTO: 31 PG (ref 27–31)
MCHC RBC AUTO-ENTMCNC: 33 G/DL (ref 33–37)
MCV RBC AUTO: 95 FL (ref 80–100)
METAMYELOCYTES NFR BLD MANUAL: 1 % (ref 0–0)
MONOCYTES NFR BLD: 4 % (ref 1.7–9.3)
NEUTS BAND NFR BLD: 18 % (ref 0–10)
NEUTS SEG NFR BLD MANUAL: 72 % (ref 42–75.2)
PLATELET # BLD AUTO: 108 K/MM3 (ref 130–400)
PLATELET BLD QL SMEAR: (no result)
PMV BLD AUTO: 13.2 FL (ref 7.4–10.4)
POTASSIUM SERPL-SCNC: 3.6 MMOL/L (ref 3.5–4.5)
RBC # BLD AUTO: 2.66 M/MM3 (ref 4.2–5.6)
SODIUM SERPL-SCNC: 141 MMOL/L (ref 136–145)
TROPONIN I SERPL-MCNC: 0.02 NG/ML (ref 0–0.03)

## 2021-11-04 NOTE — NUR
Initial shift assessment done- Tele on,  has slight temp 99.3, given his
roxicodone and tylenol as ordered for L/buttock pain 6/10,, getting Zosyn IV
at this time. L/hand INT

## 2021-11-04 NOTE — NUR
PT WENT TO CT FOR ABCESS DRAINAGE TODAY. CT FOUND NOTHING, HOWEVER, AND
PROCEDURE WAS CANCELLED. PT BEING TREATED FOR POSITIVE BLOOD CULTURE. PT HAS
BEEN TAKING MEDICATIONS FOR PAIN Q6-8 HRS. PT HAS NO NEEDS AT THIS TIME. CALL
BELL IN REACH

## 2021-11-04 NOTE — NUR
Initial visit; Patient thanked  for looking in on her and was
receptive to prayer and God's blessings.  will follow up.

## 2021-11-04 NOTE — NUR
REPORT RECIEVED FROM JANES MANN. PT AWAKE IN BED RESTING. ACKNOWLEDGES MILD
PAIN, BUT REPORTS IT IS BECAUSE HE JUST STOOD UP AND SAYS IT WILL GO AWAY
SOON. PT DENIES INTERVENTIONS. NO OTHER NEEDS AT THIS TIME. CALL BELL IN REACH

## 2021-11-04 NOTE — NUR
Patient remains NPO from midnight. PRN pain meds given throughout the night
for pain. Call light in reach. Will give report to day shift nurse.

## 2021-11-04 NOTE — NUR
Patient last recieved his farfilzomib on October 29.  He is currently out of
the window requiring use of PPE for hazardous drug precautions.

## 2021-11-04 NOTE — NUR
SW met with the patient to discuss discharge plan. The patient lives in
[place] with his wife, Deneen (ph#[phone]). He reports independence with
ADLs and has a cane and crutches. He states that at night he is unable to
get to the restroom, so uses a urinal instead. The patient's PCP is Dr. Salomón Parra and he receives his medications from [facility]. He reports no difficulties
obtaining his meds. The patient does not have a DPOA-HC, but he states that he
does have one completed and that the document in a safety deposit box. He
states that he designated his wife. The patient plans to return home with his
wife upon discharge. SW to continue to monitor.
 
*Discharge plan: home with wife*

## 2021-11-05 VITALS — DIASTOLIC BLOOD PRESSURE: 86 MMHG | HEART RATE: 79 BPM | TEMPERATURE: 98.6 F | SYSTOLIC BLOOD PRESSURE: 141 MMHG

## 2021-11-05 VITALS — TEMPERATURE: 98.2 F | SYSTOLIC BLOOD PRESSURE: 138 MMHG | DIASTOLIC BLOOD PRESSURE: 78 MMHG | HEART RATE: 94 BPM

## 2021-11-05 VITALS — TEMPERATURE: 99.5 F | DIASTOLIC BLOOD PRESSURE: 90 MMHG | HEART RATE: 77 BPM | SYSTOLIC BLOOD PRESSURE: 149 MMHG

## 2021-11-05 VITALS — TEMPERATURE: 98.6 F | DIASTOLIC BLOOD PRESSURE: 88 MMHG | SYSTOLIC BLOOD PRESSURE: 146 MMHG | HEART RATE: 86 BPM

## 2021-11-05 VITALS — DIASTOLIC BLOOD PRESSURE: 79 MMHG | TEMPERATURE: 98.5 F | HEART RATE: 76 BPM | SYSTOLIC BLOOD PRESSURE: 151 MMHG

## 2021-11-05 LAB
ANION GAP SERPL CALC-SCNC: 9 MMOL/L (ref 7–16)
ANISOCYTOSIS BLD QL: (no result)
BUN SERPL-MCNC: 20 MG/DL (ref 8–26)
CALCIUM SERPL-MCNC: 8.6 MG/DL (ref 8.4–10.2)
CHLORIDE SERPL-SCNC: 110 MMOL/L (ref 98–107)
CO2 SERPL-SCNC: 21 MMOL/L (ref 23–31)
CREAT SERPL-SCNC: 1.01 MG/DL (ref 0.72–1.25)
EOSINOPHIL NFR BLD: 6 % (ref 0–4)
ERYTHROCYTE [DISTWIDTH] IN BLOOD BY AUTOMATED COUNT: 15.5 % (ref 11.5–14.5)
GLUCOSE SERPL-MCNC: 92 MG/DL (ref 70–99)
HCT VFR BLD AUTO: 24.5 % (ref 42–52)
HGB BLD-MCNC: 7.9 G/DL (ref 13.5–18)
HYPOCHROMIA BLD QL SMEAR: (no result)
LYMPHOCYTES NFR BLD MANUAL: 4 % (ref 20–51)
MCH RBC QN AUTO: 31 PG (ref 27–31)
MCHC RBC AUTO-ENTMCNC: 32 G/DL (ref 33–37)
MCV RBC AUTO: 95 FL (ref 80–100)
MONOCYTES NFR BLD: 5 % (ref 1.7–9.3)
MYELOCYTES NFR BLD MANUAL: 1 % (ref 0–0)
NEUTS BAND NFR BLD: 7 % (ref 0–10)
NEUTS SEG NFR BLD MANUAL: 77 % (ref 42–75.2)
PLATELET # BLD AUTO: 128 K/MM3 (ref 130–400)
PLATELET BLD QL SMEAR: NORMAL
PMV BLD AUTO: 12.4 FL (ref 7.4–10.4)
POTASSIUM SERPL-SCNC: 3.4 MMOL/L (ref 3.5–4.5)
RBC # BLD AUTO: 2.58 M/MM3 (ref 4.2–5.6)
SODIUM SERPL-SCNC: 140 MMOL/L (ref 136–145)

## 2021-11-05 NOTE — NUR
Assessment complete. Patient currently resting in bed but arouses to voice; he
is alert and oriented with complaints of generalized pain. Pain medication
provided. No new concerns, call light in reach.

## 2021-11-05 NOTE — NUR
Pt has done well tovianney, feelevan better this evening after resting this
afternoon post shower and ambulation with PT. Wife at bedside eating supper.
Report given to [facility] nurse who will resume care.

## 2021-11-05 NOTE — NUR
SW met with patient to follow up pn PT recommendation of HH. Patient
verbalizes he would like to do PT as an out patient at [facility] with
therapist Derrick Yousif as he has gone to him before.
 
Information provided to SANDRO Isaacs that the patient would like to do PT as an
out patient.

## 2021-11-06 VITALS — SYSTOLIC BLOOD PRESSURE: 158 MMHG | HEART RATE: 86 BPM | TEMPERATURE: 98.5 F | DIASTOLIC BLOOD PRESSURE: 88 MMHG

## 2021-11-06 VITALS — SYSTOLIC BLOOD PRESSURE: 142 MMHG | TEMPERATURE: 98.3 F | DIASTOLIC BLOOD PRESSURE: 88 MMHG | HEART RATE: 76 BPM

## 2021-11-06 VITALS — SYSTOLIC BLOOD PRESSURE: 144 MMHG | DIASTOLIC BLOOD PRESSURE: 95 MMHG | TEMPERATURE: 98.5 F | HEART RATE: 75 BPM

## 2021-11-06 VITALS — SYSTOLIC BLOOD PRESSURE: 160 MMHG | HEART RATE: 87 BPM | DIASTOLIC BLOOD PRESSURE: 93 MMHG | TEMPERATURE: 99.6 F

## 2021-11-06 VITALS — SYSTOLIC BLOOD PRESSURE: 153 MMHG | TEMPERATURE: 97.8 F | DIASTOLIC BLOOD PRESSURE: 91 MMHG | HEART RATE: 78 BPM

## 2021-11-06 LAB
ANION GAP SERPL CALC-SCNC: 9 MMOL/L (ref 7–16)
BUN SERPL-MCNC: 17 MG/DL (ref 8–26)
CALCIUM SERPL-MCNC: 8.2 MG/DL (ref 8.4–10.2)
CHLORIDE SERPL-SCNC: 110 MMOL/L (ref 98–107)
CO2 SERPL-SCNC: 21 MMOL/L (ref 23–31)
CREAT SERPL-SCNC: 0.98 MG/DL (ref 0.72–1.25)
EOSINOPHIL NFR BLD: 1 % (ref 0–4)
ERYTHROCYTE [DISTWIDTH] IN BLOOD BY AUTOMATED COUNT: 15.5 % (ref 11.5–14.5)
GLUCOSE SERPL-MCNC: 87 MG/DL (ref 70–99)
HCT VFR BLD AUTO: 23.1 % (ref 42–52)
HGB BLD-MCNC: 7.5 G/DL (ref 13.5–18)
LYMPHOCYTES NFR BLD MANUAL: 2 % (ref 20–51)
MCH RBC QN AUTO: 32 PG (ref 27–31)
MCHC RBC AUTO-ENTMCNC: 33 G/DL (ref 33–37)
MCV RBC AUTO: 97 FL (ref 80–100)
MONOCYTES NFR BLD: 6 % (ref 1.7–9.3)
NEUTS BAND NFR BLD: 11 % (ref 0–10)
NEUTS SEG NFR BLD MANUAL: 80 % (ref 42–75.2)
PLATELET # BLD AUTO: 138 K/MM3 (ref 130–400)
PLATELET BLD QL SMEAR: NORMAL
PMV BLD AUTO: 12.2 FL (ref 7.4–10.4)
POTASSIUM SERPL-SCNC: 3.6 MMOL/L (ref 3.5–4.5)
RBC # BLD AUTO: 2.38 M/MM3 (ref 4.2–5.6)
SODIUM SERPL-SCNC: 140 MMOL/L (ref 136–145)

## 2021-11-06 NOTE — NUR
Patient ambulated independently to restroom once overnight; he does get winded
with exertion. Sats were initially 87% after returning to bed but quickly
correct to 92% on RA. No new concerns, IV abx infusing and call light in
reach.

## 2021-11-06 NOTE — NUR
PT SITTING IN BED WITH WIFE AT BEDSIDE. PT IS PLEASANT, STATES PAIN IS PRETTY
MILD RIGHT NOW, REGULAR PAIN MEDICATION HAS HELPED KEEP PAIN CONTROLLED. PT
TOOK MEDICATIONS AS PRESCRIBED, PT AND WIFE HAD NO QUESTIONS, NO COMPLAINTS.
CALL LIGHT IN REACH, NO NEEDS AT THIS TIME.

## 2021-11-06 NOTE — NUR
Pt awake upon entry to room, sitting up in bed.  No C/O pain at this time.
Shift assessment complete, left Pt call light in reach, bed in lowest
position, needs met.

## 2021-11-07 VITALS — DIASTOLIC BLOOD PRESSURE: 89 MMHG | SYSTOLIC BLOOD PRESSURE: 146 MMHG | HEART RATE: 75 BPM | TEMPERATURE: 98.2 F

## 2021-11-07 VITALS — HEART RATE: 71 BPM | SYSTOLIC BLOOD PRESSURE: 142 MMHG | TEMPERATURE: 98.7 F | DIASTOLIC BLOOD PRESSURE: 85 MMHG

## 2021-11-07 VITALS — HEART RATE: 71 BPM | TEMPERATURE: 97.7 F | DIASTOLIC BLOOD PRESSURE: 89 MMHG | SYSTOLIC BLOOD PRESSURE: 135 MMHG

## 2021-11-07 LAB
ANION GAP SERPL CALC-SCNC: 11 MMOL/L (ref 7–16)
ANISOCYTOSIS BLD QL: (no result)
BUN SERPL-MCNC: 15 MG/DL (ref 8–26)
CALCIUM SERPL-MCNC: 8.2 MG/DL (ref 8.4–10.2)
CHLORIDE SERPL-SCNC: 106 MMOL/L (ref 98–107)
CO2 SERPL-SCNC: 22 MMOL/L (ref 23–31)
CREAT SERPL-SCNC: 1.03 MG/DL (ref 0.72–1.25)
EOSINOPHIL NFR BLD: 1 % (ref 0–4)
ERYTHROCYTE [DISTWIDTH] IN BLOOD BY AUTOMATED COUNT: 15.1 % (ref 11.5–14.5)
GLUCOSE SERPL-MCNC: 85 MG/DL (ref 70–99)
HCT VFR BLD AUTO: 25.4 % (ref 42–52)
HGB BLD-MCNC: 8.2 G/DL (ref 13.5–18)
HYPOCHROMIA BLD QL SMEAR: (no result)
LYMPHOCYTES NFR BLD MANUAL: 2 % (ref 20–51)
MCH RBC QN AUTO: 30 PG (ref 27–31)
MCHC RBC AUTO-ENTMCNC: 32 G/DL (ref 33–37)
MCV RBC AUTO: 94 FL (ref 80–100)
MONOCYTES NFR BLD: 3 % (ref 1.7–9.3)
NEUTS SEG NFR BLD MANUAL: 94 % (ref 42–75.2)
PLATELET # BLD AUTO: 170 K/MM3 (ref 130–400)
PLATELET BLD QL SMEAR: NORMAL
PMV BLD AUTO: 11.6 FL (ref 7.4–10.4)
POTASSIUM SERPL-SCNC: 3.8 MMOL/L (ref 3.5–4.5)
RBC # BLD AUTO: 2.69 M/MM3 (ref 4.2–5.6)
SODIUM SERPL-SCNC: 139 MMOL/L (ref 136–145)

## 2021-11-07 NOTE — NUR
Pt pleasant all night, slept most of night. Took medications as prescribed, no
complaints excess pain. Pain is controlled with scheduled pain medication and
tyenol. Call light in reach, water replaced, no other needs at this time.

## 2021-11-07 NOTE — NUR
Patient care, medication administration and nursing documentation occurred
during a Daylight Savings Time Change.

## 2021-11-07 NOTE — NUR
Pt discharged to home, discussed discharge information with Pt.  Escorted Pt
to entrance, assisted into vehicle.  Pt left with spouse via private
transportation.

## 2021-11-07 NOTE — NUR
Pt awake upon entry to room, in bed.  No C/O pain at this time.  Shift
assessment complete, left Pt call light in reach, bed in lowest position.

## 2021-11-18 ENCOUNTER — HOSPITAL ENCOUNTER (OUTPATIENT)
Dept: HOSPITAL 19 - EUO | Age: 61
LOS: 1 days | End: 2021-11-19
Payer: COMMERCIAL

## 2021-11-18 VITALS — HEIGHT: 74.02 IN | WEIGHT: 175.27 LBS | BODY MASS INDEX: 22.49 KG/M2

## 2021-11-18 DIAGNOSIS — D64.9: ICD-10-CM

## 2021-11-18 DIAGNOSIS — C90.02: Primary | ICD-10-CM

## 2021-11-18 PROCEDURE — P9040 RBC LEUKOREDUCED IRRADIATED: HCPCS

## 2021-11-19 VITALS — SYSTOLIC BLOOD PRESSURE: 142 MMHG | TEMPERATURE: 97 F | HEART RATE: 70 BPM | DIASTOLIC BLOOD PRESSURE: 89 MMHG

## 2021-11-19 VITALS — HEART RATE: 72 BPM | TEMPERATURE: 97.1 F | SYSTOLIC BLOOD PRESSURE: 139 MMHG | DIASTOLIC BLOOD PRESSURE: 88 MMHG

## 2021-11-19 VITALS — HEART RATE: 71 BPM | DIASTOLIC BLOOD PRESSURE: 102 MMHG | SYSTOLIC BLOOD PRESSURE: 163 MMHG | TEMPERATURE: 98.7 F

## 2021-11-19 VITALS — HEART RATE: 71 BPM | TEMPERATURE: 97.5 F | SYSTOLIC BLOOD PRESSURE: 139 MMHG | DIASTOLIC BLOOD PRESSURE: 86 MMHG

## 2021-11-19 VITALS — DIASTOLIC BLOOD PRESSURE: 89 MMHG | HEART RATE: 77 BPM | SYSTOLIC BLOOD PRESSURE: 136 MMHG | TEMPERATURE: 97.4 F

## 2021-11-19 VITALS — DIASTOLIC BLOOD PRESSURE: 100 MMHG | TEMPERATURE: 98.2 F | SYSTOLIC BLOOD PRESSURE: 156 MMHG | HEART RATE: 70 BPM

## 2021-11-19 VITALS — DIASTOLIC BLOOD PRESSURE: 91 MMHG | SYSTOLIC BLOOD PRESSURE: 148 MMHG | HEART RATE: 73 BPM | TEMPERATURE: 97.5 F

## 2021-11-19 VITALS — TEMPERATURE: 97.1 F | SYSTOLIC BLOOD PRESSURE: 139 MMHG | DIASTOLIC BLOOD PRESSURE: 88 MMHG | HEART RATE: 72 BPM

## 2021-11-19 VITALS — TEMPERATURE: 97.7 F | DIASTOLIC BLOOD PRESSURE: 100 MMHG | SYSTOLIC BLOOD PRESSURE: 162 MMHG | HEART RATE: 71 BPM

## 2022-02-10 ENCOUNTER — HOSPITAL ENCOUNTER (OUTPATIENT)
Dept: HOSPITAL 19 - EUO | Age: 62
Discharge: HOME | End: 2022-02-10
Payer: COMMERCIAL

## 2022-02-10 VITALS — DIASTOLIC BLOOD PRESSURE: 91 MMHG | HEART RATE: 69 BPM | TEMPERATURE: 97.7 F | SYSTOLIC BLOOD PRESSURE: 136 MMHG

## 2022-02-10 VITALS — SYSTOLIC BLOOD PRESSURE: 135 MMHG | DIASTOLIC BLOOD PRESSURE: 94 MMHG | HEART RATE: 66 BPM | TEMPERATURE: 97.6 F

## 2022-02-10 VITALS — HEART RATE: 66 BPM | SYSTOLIC BLOOD PRESSURE: 137 MMHG | TEMPERATURE: 97.9 F | DIASTOLIC BLOOD PRESSURE: 91 MMHG

## 2022-02-10 VITALS — TEMPERATURE: 98 F | DIASTOLIC BLOOD PRESSURE: 91 MMHG | SYSTOLIC BLOOD PRESSURE: 136 MMHG | HEART RATE: 68 BPM

## 2022-02-10 VITALS — HEIGHT: 74.02 IN | WEIGHT: 160.28 LBS | BODY MASS INDEX: 20.57 KG/M2

## 2022-02-10 VITALS — SYSTOLIC BLOOD PRESSURE: 131 MMHG | HEART RATE: 71 BPM | DIASTOLIC BLOOD PRESSURE: 95 MMHG | TEMPERATURE: 97.8 F

## 2022-02-10 VITALS — DIASTOLIC BLOOD PRESSURE: 96 MMHG | SYSTOLIC BLOOD PRESSURE: 131 MMHG | HEART RATE: 67 BPM | TEMPERATURE: 97.9 F

## 2022-02-10 VITALS — SYSTOLIC BLOOD PRESSURE: 130 MMHG | DIASTOLIC BLOOD PRESSURE: 88 MMHG | HEART RATE: 69 BPM | TEMPERATURE: 98.2 F

## 2022-02-10 VITALS — SYSTOLIC BLOOD PRESSURE: 134 MMHG | TEMPERATURE: 97.8 F | DIASTOLIC BLOOD PRESSURE: 92 MMHG | HEART RATE: 70 BPM

## 2022-02-10 VITALS — SYSTOLIC BLOOD PRESSURE: 133 MMHG | TEMPERATURE: 97.9 F | DIASTOLIC BLOOD PRESSURE: 91 MMHG | HEART RATE: 66 BPM

## 2022-02-10 VITALS — HEART RATE: 69 BPM | SYSTOLIC BLOOD PRESSURE: 136 MMHG | DIASTOLIC BLOOD PRESSURE: 89 MMHG | TEMPERATURE: 97.9 F

## 2022-02-10 DIAGNOSIS — C90.02: Primary | ICD-10-CM

## 2022-02-10 DIAGNOSIS — D47.2: ICD-10-CM

## 2022-02-10 PROCEDURE — P9040 RBC LEUKOREDUCED IRRADIATED: HCPCS
